# Patient Record
Sex: FEMALE | Race: WHITE
[De-identification: names, ages, dates, MRNs, and addresses within clinical notes are randomized per-mention and may not be internally consistent; named-entity substitution may affect disease eponyms.]

---

## 2023-10-10 ENCOUNTER — HOSPITAL ENCOUNTER (INPATIENT)
Dept: HOSPITAL 93 - ER | Age: 83
LOS: 6 days | Discharge: HOME | DRG: 690 | End: 2023-10-16
Attending: INTERNAL MEDICINE | Admitting: INTERNAL MEDICINE
Payer: COMMERCIAL

## 2023-10-10 VITALS — HEIGHT: 62 IN | BODY MASS INDEX: 53.92 KG/M2 | WEIGHT: 293 LBS

## 2023-10-10 DIAGNOSIS — F03.90: ICD-10-CM

## 2023-10-10 DIAGNOSIS — I11.9: ICD-10-CM

## 2023-10-10 DIAGNOSIS — I65.21: ICD-10-CM

## 2023-10-10 DIAGNOSIS — Z79.4: ICD-10-CM

## 2023-10-10 DIAGNOSIS — E11.65: ICD-10-CM

## 2023-10-10 DIAGNOSIS — D64.9: ICD-10-CM

## 2023-10-10 DIAGNOSIS — R55: ICD-10-CM

## 2023-10-10 DIAGNOSIS — G62.9: ICD-10-CM

## 2023-10-10 DIAGNOSIS — N17.9: ICD-10-CM

## 2023-10-10 DIAGNOSIS — N39.0: Primary | ICD-10-CM

## 2023-10-10 LAB
ANION GAP SERPL CALC-SCNC: 10 MMOL/L (ref 10–20)
BACTERIA UR QL AUTO: 4117.2 UL (ref 0–1933)
BASOPHILS NFR BLD AUTO: 0.4 % (ref 0–1.5)
BUN SERPL-MCNC: 29 MG/DL (ref 7–18)
BUN/CREAT SERPL: 24 (ref 7–25)
CALCIUM SERPL-MCNC: 9.4 MG/DL (ref 8.5–10.1)
CHLORIDE SERPL-SCNC: 103 MMOL/L (ref 98–107)
CO2 SERPL-SCNC: 28 MEQ/L (ref 21–32)
CREAT SERPL-MCNC: 1.23 MG/DL (ref 0.55–1.02)
EGFRCR SERPLBLD CKD-EPI 2021: 41.8 ML/MIN/{1.73_M2}
EOSINOPHIL NFR BLD AUTO: 0.9 % (ref 0–7)
EPI CELLS URNS QL MICRO: 51.7 UL (ref 0–38.8)
ERYTHROCYTE [DISTWIDTH] IN BLOOD BY AUTOMATED COUNT: 13.6 % (ref 11.5–14.5)
GLUCOSE SERPL-MCNC: 165 MG/DL (ref 65–100)
HCT VFR BLD AUTO: 35.6 % (ref 36–45)
HGB BLD-MCNC: 11.5 G/DL (ref 12–15)
LYMPHOCYTES NFR BLD AUTO: 19.4 % (ref 12–44)
MCH RBC QN AUTO: 29.7 PG (ref 27–32)
MCHC RBC AUTO-ENTMCNC: 32.4 G/DL (ref 32–36)
MCV RBC AUTO: 91.5 FL (ref 80–100)
MONOCYTES NFR BLD AUTO: 6 % (ref 2–10)
NEUTROPHILS NFR BLD AUTO: 73.3 % (ref 47–76)
OSMOLALITY SERPL: 283 MOSM/KG (ref 275–295)
PH UR: 5.5 [PH] (ref 5–8)
PLATELET # BLD AUTO: 233 K/UL (ref 150–450)
PMV BLD AUTO: 8.2 FL (ref 7.2–11.1)
POTASSIUM SERPL-SCNC: 3.67 MEQ/L (ref 3.5–5.1)
PROT UR STRIP-MCNC: 30 MG/DL
RBC # BLD AUTO: 3.89 M/UL (ref 4–6)
RBC #/AREA URNS AUTO: 2.7 UL (ref 0–20.8)
SODIUM SERPL-SCNC: 137 MMOL/L (ref 136–145)
SP GR UR STRIP: 1.02 (ref 1–1.03)
UROBILINOGEN UR STRIP-MCNC: 0.2 E.U./DL
WBC # BLD AUTO: 11.9 K/UL (ref 4.5–11)
WBC URNS QL MICRO: 24.5 UL (ref 0–23.2)

## 2023-10-10 PROCEDURE — BW28ZZZ COMPUTERIZED TOMOGRAPHY (CT SCAN) OF HEAD: ICD-10-PCS | Performed by: EMERGENCY MEDICINE

## 2023-10-10 PROCEDURE — B24BZZZ ULTRASONOGRAPHY OF HEART WITH AORTA: ICD-10-PCS | Performed by: INTERNAL MEDICINE

## 2023-10-10 NOTE — NUR
SE ORIENTA PTE Y FAMILIAR SOBRE TX A SEGUIR, EL CUAL REFIEREN ENTENDER. SE
COLECTAN MUESTRAS Y SE CANALIZA PTE UTILIZANDO MEDIDAS ASEPTICAS. SE ADM.
MEDICAMENTOS RENITA ORDEN MEDICA BAJO MEDIDAS ASEPTICAS. SE REALIZA EKG Y SE LE
PRESENTA A DR. TANNER.

## 2023-10-10 NOTE — NUR
PTE ALERTA Y ORIENTADA POR HERB ESFERAS CON BUEN PATRON RESPIRATORIO REFIERE
QUE LE TIFF "UN BAJON DE AZUCAR Y PERDIO CONOCIMIENTO", NO RELAJO ESFINTER.

## 2023-10-11 LAB
APTT PPP: 32.4 SECONDS (ref 22–34)
INR PPP: 0.95
PROTHROMBIN TIME: 10 SECONDS (ref 9–11.5)

## 2023-10-11 PROCEDURE — B345ZZZ ULTRASONOGRAPHY OF BILATERAL COMMON CAROTID ARTERIES: ICD-10-PCS | Performed by: INTERNAL MEDICINE

## 2023-10-12 LAB
ALBUMIN SERPL-MCNC: 3.3 GM/DL (ref 3.4–5)
ALP SERPL-CCNC: 80 U/L (ref 50–136)
ALT SERPL-CCNC: 19 U/L (ref 12–78)
ANION GAP SERPL CALC-SCNC: 12 MMOL/L (ref 10–20)
BACTERIA UR QL AUTO: 280.8 UL (ref 0–1933)
BASOPHILS NFR BLD AUTO: 0.7 % (ref 0–1.5)
BILIRUB SERPL-MCNC: 0.27 MG/DL (ref 0.3–1.2)
BUN SERPL-MCNC: 16 MG/DL (ref 7–18)
BUN/CREAT SERPL: 21 (ref 7–25)
CALCIUM SERPL-MCNC: 8.5 MG/DL (ref 8.5–10.1)
CHLORIDE SERPL-SCNC: 109 MMOL/L (ref 98–107)
CO2 SERPL-SCNC: 22 MEQ/L (ref 21–32)
CREAT SERPL-MCNC: 0.77 MG/DL (ref 0.55–1.02)
EGFRCR SERPLBLD CKD-EPI 2021: 71.77 ML/MIN/{1.73_M2}
EOSINOPHIL NFR BLD AUTO: 4.3 % (ref 0–7)
EPI CELLS URNS QL MICRO: 38.1 UL (ref 0–38.8)
ERYTHROCYTE [DISTWIDTH] IN BLOOD BY AUTOMATED COUNT: 13.8 % (ref 11.5–14.5)
GLOBULIN SER CALC-MCNC: 3.1 G/DL (ref 2.4–3.5)
GLUCOSE SERPL-MCNC: 85 MG/DL (ref 65–100)
HCT VFR BLD AUTO: 32.6 % (ref 36–45)
HGB BLD-MCNC: 11.1 G/DL (ref 12–15)
LYMPHOCYTES NFR BLD AUTO: 31 % (ref 12–44)
MAGNESIUM SERPL-MCNC: 2 MG/DL (ref 1.8–2.4)
MCH RBC QN AUTO: 30.6 PG (ref 27–32)
MCHC RBC AUTO-ENTMCNC: 34 G/DL (ref 32–36)
MCV RBC AUTO: 90 FL (ref 80–100)
MONOCYTES NFR BLD AUTO: 8.2 % (ref 2–10)
NEUTROPHILS NFR BLD AUTO: 55.8 % (ref 47–76)
OSMOLALITY SERPL: 276 MOSM/KG (ref 275–295)
PH UR: 5.5 [PH] (ref 5–8)
PHOSPHATE SERPL-MCNC: 3.3 MG/DL (ref 2.5–4.9)
PLATELET # BLD AUTO: 222 K/UL (ref 150–450)
PMV BLD AUTO: 8.9 FL (ref 7.2–11.1)
POTASSIUM SERPL-SCNC: 4.53 MEQ/L (ref 3.5–5.1)
PROT SERPL-MCNC: 6.4 GM/DL (ref 6.4–8.2)
RBC # BLD AUTO: 3.62 M/UL (ref 4–6)
RBC #/AREA URNS AUTO: 1.2 UL (ref 0–20.8)
SCC AG SERPL-SCNC: 19 U/L (ref 15–37)
SODIUM SERPL-SCNC: 138 MMOL/L (ref 136–145)
SP GR UR STRIP: 1.01 (ref 1–1.03)
UROBILINOGEN UR STRIP-MCNC: 0.2 E.U./DL
WBC # BLD AUTO: 9.7 K/UL (ref 4.5–11)
WBC URNS QL MICRO: 19.4 UL (ref 0–23.2)

## 2023-10-14 LAB
ALBUMIN SERPL-MCNC: 3.2 GM/DL (ref 3.4–5)
ALP SERPL-CCNC: 84 U/L (ref 50–136)
ALT SERPL-CCNC: 30 U/L (ref 12–78)
ANION GAP SERPL CALC-SCNC: 10 MMOL/L (ref 10–20)
BASOPHILS NFR BLD AUTO: 0.5 % (ref 0–1.5)
BILIRUB SERPL-MCNC: 0.24 MG/DL (ref 0.3–1.2)
BUN SERPL-MCNC: 19 MG/DL (ref 7–18)
BUN/CREAT SERPL: 23 (ref 7–25)
CALCIUM SERPL-MCNC: 8.8 MG/DL (ref 8.5–10.1)
CHLORIDE SERPL-SCNC: 105 MMOL/L (ref 98–107)
CO2 SERPL-SCNC: 28 MEQ/L (ref 21–32)
CREAT SERPL-MCNC: 0.84 MG/DL (ref 0.55–1.02)
EGFRCR SERPLBLD CKD-EPI 2021: 64.91 ML/MIN/{1.73_M2}
EOSINOPHIL NFR BLD AUTO: 3.6 % (ref 0–7)
ERYTHROCYTE [DISTWIDTH] IN BLOOD BY AUTOMATED COUNT: 13.2 % (ref 11.5–14.5)
GLOBULIN SER CALC-MCNC: 2.9 G/DL (ref 2.4–3.5)
GLUCOSE SERPL-MCNC: 177 MG/DL (ref 65–100)
HCT VFR BLD AUTO: 33 % (ref 36–45)
HGB BLD-MCNC: 10.7 G/DL (ref 12–15)
LYMPHOCYTES NFR BLD AUTO: 28.8 % (ref 12–44)
MAGNESIUM SERPL-MCNC: 2 MG/DL (ref 1.8–2.4)
MCH RBC QN AUTO: 29.5 PG (ref 27–32)
MCHC RBC AUTO-ENTMCNC: 32.5 G/DL (ref 32–36)
MCV RBC AUTO: 90.9 FL (ref 80–100)
MONOCYTES NFR BLD AUTO: 9.7 % (ref 2–10)
NEUTROPHILS NFR BLD AUTO: 57.4 % (ref 47–76)
OSMOLALITY SERPL: 284 MOSM/KG (ref 275–295)
PHOSPHATE SERPL-MCNC: 3.3 MG/DL (ref 2.5–4.9)
PLATELET # BLD AUTO: 214 K/UL (ref 150–450)
PMV BLD AUTO: 8.4 FL (ref 7.2–11.1)
POTASSIUM SERPL-SCNC: 3.99 MEQ/L (ref 3.5–5.1)
PROT SERPL-MCNC: 6.1 GM/DL (ref 6.4–8.2)
RBC # BLD AUTO: 3.63 M/UL (ref 4–6)
SCC AG SERPL-SCNC: 17 U/L (ref 15–37)
SODIUM SERPL-SCNC: 139 MMOL/L (ref 136–145)
WBC # BLD AUTO: 8.4 K/UL (ref 4.5–11)

## 2023-10-16 LAB
ALBUMIN SERPL-MCNC: 3.1 GM/DL (ref 3.4–5)
ALP SERPL-CCNC: 74 U/L (ref 50–136)
ALT SERPL-CCNC: 37 U/L (ref 12–78)
ANION GAP SERPL CALC-SCNC: 8 MMOL/L (ref 10–20)
BASOPHILS NFR BLD AUTO: 0.4 % (ref 0–1.5)
BILIRUB SERPL-MCNC: 0.3 MG/DL (ref 0.3–1.2)
BUN SERPL-MCNC: 20 MG/DL (ref 7–18)
BUN/CREAT SERPL: 25 (ref 7–25)
CALCIUM SERPL-MCNC: 8.7 MG/DL (ref 8.5–10.1)
CHLORIDE SERPL-SCNC: 102 MMOL/L (ref 98–107)
CO2 SERPL-SCNC: 31 MEQ/L (ref 21–32)
CREAT SERPL-MCNC: 0.79 MG/DL (ref 0.55–1.02)
EGFRCR SERPLBLD CKD-EPI 2021: 69.67 ML/MIN/{1.73_M2}
EOSINOPHIL NFR BLD AUTO: 3.5 % (ref 0–7)
ERYTHROCYTE [DISTWIDTH] IN BLOOD BY AUTOMATED COUNT: 13.5 % (ref 11.5–14.5)
GLOBULIN SER CALC-MCNC: 2.9 G/DL (ref 2.4–3.5)
GLUCOSE SERPL-MCNC: 179 MG/DL (ref 65–100)
HCT VFR BLD AUTO: 29.8 % (ref 36–45)
HGB BLD-MCNC: 10.1 G/DL (ref 12–15)
LYMPHOCYTES NFR BLD AUTO: 23.8 % (ref 12–44)
MAGNESIUM SERPL-MCNC: 1.9 MG/DL (ref 1.8–2.4)
MCH RBC QN AUTO: 30.1 PG (ref 27–32)
MCHC RBC AUTO-ENTMCNC: 33.8 G/DL (ref 32–36)
MCV RBC AUTO: 89.1 FL (ref 80–100)
MONOCYTES NFR BLD AUTO: 11.3 % (ref 2–10)
NEUTROPHILS NFR BLD AUTO: 61 % (ref 47–76)
OSMOLALITY SERPL: 281 MOSM/KG (ref 275–295)
PHOSPHATE SERPL-MCNC: 3.9 MG/DL (ref 2.5–4.9)
PLATELET # BLD AUTO: 203 K/UL (ref 150–450)
PMV BLD AUTO: 8.8 FL (ref 7.2–11.1)
POTASSIUM SERPL-SCNC: 3.94 MEQ/L (ref 3.5–5.1)
PROT SERPL-MCNC: 6 GM/DL (ref 6.4–8.2)
RBC # BLD AUTO: 3.35 M/UL (ref 4–6)
SCC AG SERPL-SCNC: 20 U/L (ref 15–37)
SODIUM SERPL-SCNC: 137 MMOL/L (ref 136–145)
WBC # BLD AUTO: 9.4 K/UL (ref 4.5–11)